# Patient Record
Sex: FEMALE | Race: WHITE | NOT HISPANIC OR LATINO | Employment: FULL TIME | URBAN - METROPOLITAN AREA
[De-identification: names, ages, dates, MRNs, and addresses within clinical notes are randomized per-mention and may not be internally consistent; named-entity substitution may affect disease eponyms.]

---

## 2024-09-11 ENCOUNTER — OFFICE VISIT (OUTPATIENT)
Dept: FAMILY MEDICINE CLINIC | Facility: CLINIC | Age: 24
End: 2024-09-11
Payer: COMMERCIAL

## 2024-09-11 ENCOUNTER — PATIENT ROUNDING (BHMG ONLY) (OUTPATIENT)
Dept: FAMILY MEDICINE CLINIC | Facility: CLINIC | Age: 24
End: 2024-09-11
Payer: COMMERCIAL

## 2024-09-11 VITALS
OXYGEN SATURATION: 98 % | SYSTOLIC BLOOD PRESSURE: 114 MMHG | BODY MASS INDEX: 23.76 KG/M2 | DIASTOLIC BLOOD PRESSURE: 68 MMHG | HEIGHT: 67 IN | HEART RATE: 52 BPM | WEIGHT: 151.4 LBS

## 2024-09-11 DIAGNOSIS — Z00.01 ENCOUNTER FOR GENERAL ADULT MEDICAL EXAMINATION WITH ABNORMAL FINDINGS: Primary | ICD-10-CM

## 2024-09-11 PROCEDURE — 99385 PREV VISIT NEW AGE 18-39: CPT | Performed by: NURSE PRACTITIONER

## 2024-09-11 PROCEDURE — 90651 9VHPV VACCINE 2/3 DOSE IM: CPT | Performed by: NURSE PRACTITIONER

## 2024-09-11 PROCEDURE — 90471 IMMUNIZATION ADMIN: CPT | Performed by: NURSE PRACTITIONER

## 2024-09-11 PROCEDURE — 90472 IMMUNIZATION ADMIN EACH ADD: CPT | Performed by: NURSE PRACTITIONER

## 2024-09-11 PROCEDURE — 90715 TDAP VACCINE 7 YRS/> IM: CPT | Performed by: NURSE PRACTITIONER

## 2024-09-11 NOTE — PROGRESS NOTES
How did you hear about our practice/providers? [facility], [place] [facility]    Tell me about your visit with us. What things went well?  EVERYTHING       We're always looking for ways to make our patients' experiences even better. Do you have recommendations on ways we may improve? NO      Overall were you satisfied with your first visit to our practice? YES       Is there anything else I can do for you?  Would you like for me to have my  you? NO    You may receive a survey from [employer] via mail, text or email to provide feedback about your visit.  We ask that you please take a few minutes to complete the survey and let us know how we are doing.  If for any reason you feel unable to give us the highest rating please let me know.

## 2024-09-11 NOTE — PROGRESS NOTES
Subjective   Agata Stokes is a 24 y.o. female who presents for annual female wellness exam.  Chief Complaint   Patient presents with    Establish Care     Pt needs annual check up and needs pap but she's on her period and wants to do a different day.         Patient is here today to establish care as a new patient. She has not had pcp since having pediatrician.    Has no new complaints today    Menstrual History: regular  Pregnancy History: none  Sexual History: not currently   Contraception: none  Hormone Replacement Therapy: n/a  Diet: overall healthy,  Drinks mostly water  Exercise: 2-3 days weekly   Do you feel safe? Reports she does  Have you ever been abused? Denies  Dentist:twice yearly   Eye doctor: not in last year, denies issues with vision    Mammogram:  n/a  Pap Smear: never  Bone Density: n/a  Colon Cancer Screening: n/a    Immunization History   Administered Date(s) Administered    Hpv9 09/11/2024    PPD Test 06/15/2021    Tdap 09/11/2024       The following portions of the patient's history were reviewed and updated as appropriate: allergies, current medications, past family history, past medical history, past social history, past surgical history and problem list.    History reviewed. No pertinent past medical history.    Past Surgical History:   Procedure Laterality Date    TONSILLECTOMY         History reviewed. No pertinent family history.    Social History     Socioeconomic History    Marital status: Single   Tobacco Use    Smoking status: Never    Smokeless tobacco: Never   Vaping Use    Vaping status: Never Used   Substance and Sexual Activity    Alcohol use: Yes     Comment: on occasion    Drug use: Never    Sexual activity: Not Currently     Partners: Male, Female       Review of Systems   Constitutional:  Negative for fatigue and fever.   Respiratory:  Negative for cough, shortness of breath and wheezing.    Cardiovascular:  Negative for chest pain.   Gastrointestinal:  Negative for  abdominal pain, constipation, diarrhea, nausea and vomiting.   Genitourinary:  Negative for dysuria and urgency.   Skin: Negative.    Neurological:  Negative for dizziness and headache.   Psychiatric/Behavioral:  Negative for suicidal ideas and depressed mood. The patient is not nervous/anxious.        Objective   Vitals:    09/11/24 1404   BP: 114/68   Pulse: 52   SpO2: 98%     Body mass index is 23.71 kg/m².  Physical Exam  Constitutional:       Appearance: Normal appearance.   Cardiovascular:      Rate and Rhythm: Normal rate and regular rhythm.      Pulses: Normal pulses.   Pulmonary:      Effort: Pulmonary effort is normal.      Breath sounds: Normal breath sounds.   Skin:     General: Skin is warm and dry.   Neurological:      Mental Status: She is alert.   Psychiatric:         Mood and Affect: Mood normal.         Behavior: Behavior normal.         Thought Content: Thought content normal.         Judgment: Judgment normal.           Assessment & Plan   Diagnoses and all orders for this visit:    1. Encounter for general adult medical examination with abnormal findings (Primary)  -     HPV Vaccine (HPV9)    Other orders  -     Tdap Vaccine => 8yo IM (BOOSTRIX/ADACEL)    Physical complete for patient.   Healthy adult physical  Ok for tdap  Ok for hpv vaccine. Discussed 2nd and third.  She is going to return to office in 2 months for second hpv and for PAP smear  She declines STD testing today.  She also declines flu vaccine    Follow up yearly for physical and sooner as needed.            Discussed the importance of maintaining a healthy weight and getting regular exercise.  Educated patient on the benefits of healthy diet.  Advise follow-up annually for wellness exams.

## 2024-09-16 ENCOUNTER — PATIENT ROUNDING (BHMG ONLY) (OUTPATIENT)
Dept: FAMILY MEDICINE CLINIC | Facility: CLINIC | Age: 24
End: 2024-09-16
Payer: COMMERCIAL

## 2024-09-24 ENCOUNTER — E-VISIT (OUTPATIENT)
Dept: FAMILY MEDICINE CLINIC | Facility: TELEHEALTH | Age: 24
End: 2024-09-24
Payer: COMMERCIAL

## 2024-09-24 PROCEDURE — FABRICHEALTHVISIT: Performed by: NURSE PRACTITIONER

## 2024-11-11 ENCOUNTER — OFFICE VISIT (OUTPATIENT)
Dept: FAMILY MEDICINE CLINIC | Facility: CLINIC | Age: 24
End: 2024-11-11
Payer: COMMERCIAL

## 2024-11-11 VITALS
HEIGHT: 67 IN | OXYGEN SATURATION: 95 % | SYSTOLIC BLOOD PRESSURE: 112 MMHG | DIASTOLIC BLOOD PRESSURE: 62 MMHG | HEART RATE: 63 BPM | WEIGHT: 151.2 LBS | BODY MASS INDEX: 23.73 KG/M2

## 2024-11-11 DIAGNOSIS — Z12.4 SCREENING FOR CERVICAL CANCER: Primary | ICD-10-CM

## 2024-11-11 DIAGNOSIS — Z23 ENCOUNTER FOR ADMINISTRATION OF VACCINE: ICD-10-CM

## 2024-11-11 PROCEDURE — 90471 IMMUNIZATION ADMIN: CPT | Performed by: NURSE PRACTITIONER

## 2024-11-11 PROCEDURE — 90651 9VHPV VACCINE 2/3 DOSE IM: CPT | Performed by: NURSE PRACTITIONER

## 2024-11-11 PROCEDURE — 99212 OFFICE O/P EST SF 10 MIN: CPT | Performed by: NURSE PRACTITIONER

## 2024-11-11 NOTE — PROGRESS NOTES
"Chief Complaint  Gynecologic Exam (Pt on last day of period said its very spotty. )    Subjective        Agata Stokes presents to [facility]  History of Present Illness    Patient is here today for pap smear    HPV vaccine due for second    Objective   Vital Signs:  /62   Pulse 63   Ht 170.2 cm (67.01\")   Wt 68.6 kg (151 lb 3.2 oz)   SpO2 95%   BMI 23.68 kg/m²   Estimated body mass index is 23.68 kg/m² as calculated from the following:    Height as of this encounter: 170.2 cm (67.01\").    Weight as of this encounter: 68.6 kg (151 lb 3.2 oz).    BMI is within normal parameters. No other follow-up for BMI required.      Physical Exam  Constitutional:       Appearance: Normal appearance.   Cardiovascular:      Rate and Rhythm: Normal rate and regular rhythm.      Pulses: Normal pulses.   Pulmonary:      Effort: Pulmonary effort is normal.      Breath sounds: Normal breath sounds.   Genitourinary:     General: Normal vulva.      Exam position: Supine.      Vagina: Normal.      Cervix: Normal.   Skin:     General: Skin is warm and dry.   Neurological:      Mental Status: She is alert.   Psychiatric:         Mood and Affect: Mood normal.         Behavior: Behavior normal.         Thought Content: Thought content normal.         Judgment: Judgment normal.        Result Review :                Assessment and Plan   Diagnoses and all orders for this visit:    1. Screening for cervical cancer (Primary)  -     IGP, Aptima HPV, CtNg Age Gdln    2. Encounter for administration of vaccine  -     HPV Vaccine (HPV9)      Smear obtained.  Will call with results any changes needed plan of care.  Second HPV vaccine given.  Instructions for follow-up discussed.  She verbalized understanding and is agreeable.        Follow Up   No follow-ups on file.  Patient was given instructions and counseling regarding her condition or for health maintenance advice. Please see specific information pulled " into the AVS if appropriate.

## 2024-11-18 LAB
AGE GDLN ACOG TESTING: NORMAL
C TRACH RRNA CVX QL NAA+PROBE: NEGATIVE
CONV .: NORMAL
CYTOLOGIST CVX/VAG CYTO: NORMAL
CYTOLOGY CVX/VAG DOC CYTO: NORMAL
CYTOLOGY CVX/VAG DOC THIN PREP: NORMAL
DX ICD CODE: NORMAL
Lab: NORMAL
N GONORRHOEA RRNA CVX QL NAA+PROBE: NEGATIVE
OTHER STN SPEC: NORMAL
STAT OF ADQ CVX/VAG CYTO-IMP: NORMAL

## 2024-11-18 NOTE — PROGRESS NOTES
Let patient know that Pap smear is negative for any lesion or malignancy.  Recommend repeating 3 years.

## 2025-03-20 ENCOUNTER — OFFICE VISIT (OUTPATIENT)
Dept: FAMILY MEDICINE CLINIC | Facility: CLINIC | Age: 25
End: 2025-03-20
Payer: COMMERCIAL

## 2025-03-20 VITALS
OXYGEN SATURATION: 99 % | WEIGHT: 159.4 LBS | DIASTOLIC BLOOD PRESSURE: 66 MMHG | HEART RATE: 62 BPM | HEIGHT: 67 IN | BODY MASS INDEX: 25.02 KG/M2 | SYSTOLIC BLOOD PRESSURE: 90 MMHG

## 2025-03-20 DIAGNOSIS — Z30.09 CONTRACEPTIVE EDUCATION: ICD-10-CM

## 2025-03-20 DIAGNOSIS — Z23 ENCOUNTER FOR ADMINISTRATION OF VACCINE: Primary | ICD-10-CM

## 2025-03-20 NOTE — PROGRESS NOTES
"Chief Complaint  Other (Discuss hormone free iud)    Subjective        Agata Stokes presents to [facility]  History of Present Illness    Patient presents today to discuss vaccine follow-up and discuss contraceptive measures.    Objective   Vital Signs:  BP 90/66   Pulse 62   Ht 170.2 cm (67.01\")   Wt 72.3 kg (159 lb 6.4 oz)   SpO2 99%   BMI 24.96 kg/m²   Estimated body mass index is 24.96 kg/m² as calculated from the following:    Height as of this encounter: 170.2 cm (67.01\").    Weight as of this encounter: 72.3 kg (159 lb 6.4 oz).    BMI is within normal parameters. No other follow-up for BMI required.      Physical Exam  Constitutional:       Appearance: Normal appearance.   Cardiovascular:      Rate and Rhythm: Normal rate and regular rhythm.      Pulses: Normal pulses.   Pulmonary:      Effort: Pulmonary effort is normal.      Breath sounds: Normal breath sounds.   Skin:     General: Skin is warm and dry.   Neurological:      Mental Status: She is alert.   Psychiatric:         Mood and Affect: Mood normal.         Behavior: Behavior normal.         Thought Content: Thought content normal.         Judgment: Judgment normal.        Result Review :                Assessment and Plan   Diagnoses and all orders for this visit:    1. Encounter for administration of vaccine (Primary)  -     HPV Vaccine (HPV9)  -     Meningococcal B (BEXSERO) intramuscular vaccine 0.5 mL    2. Contraceptive education  -     Ambulatory Referral to Obstetrics / Gynecology    Other orders  -     Cancel: Trumenba Menigococcal Vaccine IM      Okay for hep B and HPV vaccine.  Referral made to OB/GYN if she would like to consider nonhormonal IUD.  We did discuss other birth control options and this is the one she would like to do.  We did discuss different contraceptive measures in the meantime but she declined Depo, oral options.  Encouraged use of condom for sexual activity.       Follow Up   No " follow-ups on file.  Patient was given instructions and counseling regarding her condition or for health maintenance advice. Please see specific information pulled into the AVS if appropriate.

## 2025-03-27 ENCOUNTER — OFFICE VISIT (OUTPATIENT)
Dept: OBSTETRICS AND GYNECOLOGY | Age: 25
End: 2025-03-27
Payer: COMMERCIAL

## 2025-03-27 VITALS
WEIGHT: 158.4 LBS | SYSTOLIC BLOOD PRESSURE: 98 MMHG | HEIGHT: 67 IN | DIASTOLIC BLOOD PRESSURE: 58 MMHG | BODY MASS INDEX: 24.86 KG/M2

## 2025-03-27 DIAGNOSIS — Z30.09 ENCOUNTER FOR OTHER GENERAL COUNSELING OR ADVICE ON CONTRACEPTION: Primary | ICD-10-CM

## 2025-03-27 NOTE — PROGRESS NOTES
[facility]   Obstetrics and Gynecology   New Gynecology Visit    3/27/2025    Patient: Agata Stokes          MR#:[id number]    History of Present Illness    Chief Complaint   Patient presents with    Gynecologic Exam     New Gyn, pt would like to discuss IUD options.      25 y.o. female  who presents for contraception counseling.    Patient believes she desires an IUD. She had a Kyleena on  but reports she had bothersome irregular spotting and worsening of acne. She had that removed. She did not use any contraception for awhile, but is now in a relationship and desires reliable contraception.    She reports her periods are monthly with heavy bleeding and bad cramping which is why she tried a Kyleena.    Obstetric History:  OB History          0    Para   0    Term   0       0    AB   0    Living   0         SAB   0    IAB   0    Ectopic   0    Molar   0    Multiple   0    Live Births   0               Menstrual History:     Patient's last menstrual period was 03/15/2025 (exact date).       Sexual History:   Monogamous sexual relationship with male    Concern for STI?: denies  Breast concerns: denies  Dyspareunia: denies  Last Pap: 2024 NILM, s/p gardasil  ________________________________________  There is no problem list on file for this patient.    History reviewed. No pertinent past medical history.  Past Surgical History:   Procedure Laterality Date    TONSILLECTOMY      WISDOM TOOTH EXTRACTION       Social History     Tobacco Use    Smoking status: Never    Smokeless tobacco: Never   Vaping Use    Vaping status: Never Used   Substance Use Topics    Alcohol use: Yes     Alcohol/week: 6.0 standard drinks of alcohol     Types: 3 Glasses of wine, 3 Cans of beer per week     Comment: on occasion    Drug use: Never     Family History   Problem Relation Age of Onset    No Known Problems Father     No Known Problems Mother     No Known Problems Paternal Grandfather     No  "Known Problems Paternal Grandmother     No Known Problems Maternal Grandmother     No Known Problems Maternal Grandfather     Breast cancer Neg Hx     Uterine cancer Neg Hx     Ovarian cancer Neg Hx     Colon cancer Neg Hx     Pancreatic cancer Neg Hx     Prostate cancer Neg Hx      Prior to Admission medications    Not on File     ________________________________________    Review of Systems   Genitourinary:  Positive for menstrual problem.          Objective     BP 98/58   Ht 170.2 cm (67.01\")   Wt 71.8 kg (158 lb 6.4 oz)   LMP 03/15/2025 (Exact Date)   BMI 24.80 kg/m²    BP Readings from Last 3 Encounters:   25 98/58   25 90/66   24 112/62      Wt Readings from Last 3 Encounters:   25 71.8 kg (158 lb 6.4 oz)   25 72.3 kg (159 lb 6.4 oz)   24 68.6 kg (151 lb 3.2 oz)        BMI: Estimated body mass index is 24.8 kg/m² as calculated from the following:    Height as of this encounter: 170.2 cm (67.01\").    Weight as of this encounter: 71.8 kg (158 lb 6.4 oz).    Physical Exam  Vitals and nursing note reviewed.   Constitutional:       General: She is not in acute distress.     Appearance: Normal appearance.   HENT:      Head: Normocephalic and atraumatic.   Eyes:      Extraocular Movements: Extraocular movements intact.   Pulmonary:      Effort: Pulmonary effort is normal. No respiratory distress.   Abdominal:      General: There is no distension.   Skin:     General: Skin is warm and dry.   Neurological:      General: No focal deficit present.      Mental Status: She is alert.   Psychiatric:         Mood and Affect: Mood normal.         Behavior: Behavior normal.       Assessment:  Agata Stokes is a 25 y.o.  presenting for contraceptive counseling.       Encounter for other general counseling or advice on contraception  Reviewed contraceptive options with patient.  She initially thought she might want a copper IUD in efforts to avoid hormones, but after reviewing that " this would likely worsen her menstrual bleeding and cramping, she decided against this.  I did discuss that worsening of acne is overall fairly rare with a progesterone IUD, but since she did not have a good experience with Kyleena previously, I would advise trialing Mirena to see how this goes.  We discussed giving at least 3 months for bleeding to regulate and to monitor for other adverse side effects.  We did discuss options in case she does not like the Mirena as well.  It seems like a good option for her if the IUD is unsuccessful would be a vaginal ring-either NuvaRing or Annovera.  She states she cannot remember to take OCPs daily and feels the patch would be bothersome as well. Will check patient benefits and have her return to office for mirena IUD insertion.       Plan:  Return in about 2 weeks (around 4/10/2025) for Mirena insertion.    Mily Almonte MD  3/27/2025 10:30 EDT

## 2025-03-28 ENCOUNTER — PATIENT ROUNDING (BHMG ONLY) (OUTPATIENT)
Dept: OBSTETRICS AND GYNECOLOGY | Age: 25
End: 2025-03-28
Payer: COMMERCIAL

## 2025-04-10 ENCOUNTER — OFFICE VISIT (OUTPATIENT)
Dept: OBSTETRICS AND GYNECOLOGY | Age: 25
End: 2025-04-10
Payer: COMMERCIAL

## 2025-04-10 VITALS
DIASTOLIC BLOOD PRESSURE: 70 MMHG | HEIGHT: 67 IN | WEIGHT: 158.6 LBS | BODY MASS INDEX: 24.89 KG/M2 | SYSTOLIC BLOOD PRESSURE: 106 MMHG

## 2025-04-10 DIAGNOSIS — Z01.818 PRE-OP TESTING: ICD-10-CM

## 2025-04-10 DIAGNOSIS — Z30.430 ENCOUNTER FOR IUD INSERTION: Primary | ICD-10-CM

## 2025-04-10 LAB
B-HCG UR QL: NEGATIVE
EXPIRATION DATE: NORMAL
INTERNAL NEGATIVE CONTROL: NORMAL
INTERNAL POSITIVE CONTROL: NORMAL
Lab: NORMAL

## 2025-04-10 NOTE — PROGRESS NOTES
IUD Insertion Procedure Note    Indication: Desires long acting reversible contraception     Procedure Details   Urine pregnancy test confirmed negative.  The risks (including infection, bleeding, pain, and uterine perforation) and benefits of the procedure were explained to the patient and verbal informed consent was obtained. Time out was performed.     Cervix was cleansed with Betadine. Allis clamp applied to anterior cervix.  Uterus sounded to 8 cm. IUD inserted without difficulty. Strings trimmed to 2-3 cm.    IUD Information:  Mirena, Lot # [id number], Expiration date 2027 Feb.    Condition:  Stable    Complications:  None, patient tolerated the procedure well    Plan:  The patient was advised to call for fever, prolonged or severe pain, or persistent bleeding. She was advised to use NSAIDs as needed for mild to moderate pain.  Discussed using back up form of contraception for 1 week.  Discussed expectation of irregular bleeding for 3-6 months but then generally resolves.  Follow up 6 weeks for string check.    Procedure time: 7 minutes    Mily Almonte MD  04/10/25  09:50 EDT

## 2025-05-15 ENCOUNTER — OFFICE VISIT (OUTPATIENT)
Dept: OBSTETRICS AND GYNECOLOGY | Age: 25
End: 2025-05-15
Payer: COMMERCIAL

## 2025-05-15 VITALS
DIASTOLIC BLOOD PRESSURE: 72 MMHG | SYSTOLIC BLOOD PRESSURE: 116 MMHG | BODY MASS INDEX: 23.86 KG/M2 | HEIGHT: 67 IN | WEIGHT: 152 LBS

## 2025-05-15 DIAGNOSIS — Z30.431 IUD CHECK UP: Primary | ICD-10-CM

## 2025-05-15 NOTE — PROGRESS NOTES
[facility]  Obstetrics and Gynecology   Return Gynecology Visit    5/15/2025    Patient: Agata Stokes          MR#:[id number]    History of Present Illness    Chief Complaint   Patient presents with    Follow-up     Pt being seen for IUD string check, pt has no complaints today.     25 y.o. female  who presents for IUD string check.    Patient reports for the first couple of weeks she had daily bleeding and some cramping but this has now improved.  Few days ago she was supposed to start her period according to her flow zach and she only had light spotting.  Has had intercourse with IUD in place and denies any discomfort for her or her partner.  She is moving to [place] in a few months.    Obstetric History:  OB History          0    Para   0    Term   0       0    AB   0    Living   0         SAB   0    IAB   0    Ectopic   0    Molar   0    Multiple   0    Live Births   0               Menstrual History:     No LMP recorded. Patient has had an implant.       ________________________________________  There is no problem list on file for this patient.    History reviewed. No pertinent past medical history.  Past Surgical History:   Procedure Laterality Date    TONSILLECTOMY      WISDOM TOOTH EXTRACTION       Social History     Tobacco Use    Smoking status: Never    Smokeless tobacco: Never   Vaping Use    Vaping status: Never Used   Substance Use Topics    Alcohol use: Yes     Alcohol/week: 6.0 standard drinks of alcohol     Types: 3 Glasses of wine, 3 Cans of beer per week     Comment: on occasion    Drug use: Never     Family History   Problem Relation Age of Onset    No Known Problems Father     No Known Problems Mother     No Known Problems Paternal Grandfather     No Known Problems Paternal Grandmother     No Known Problems Maternal Grandmother     No Known Problems Maternal Grandfather     Breast cancer Neg Hx     Uterine cancer Neg Hx     Ovarian cancer Neg Hx     Colon cancer Neg  "Hx     Pancreatic cancer Neg Hx     Prostate cancer Neg Hx      Prior to Admission medications    Medication Sig Start Date End Date Taking? Authorizing Provider   Levonorgestrel (Mirena, 52 MG,) 20 MCG/DAY intrauterine device IUD To be inserted one time by prescriber. Route intrauterine. 3/27/25  Yes Mily Almonte MD     ________________________________________    Review of Systems   Genitourinary:  Negative for menstrual problem.          Objective     /72   Ht 170.2 cm (67.01\")   Wt 68.9 kg (152 lb)   BMI 23.80 kg/m²    BP Readings from Last 3 Encounters:   05/15/25 116/72   04/10/25 106/70   25 98/58      Wt Readings from Last 3 Encounters:   05/15/25 68.9 kg (152 lb)   04/10/25 71.9 kg (158 lb 9.6 oz)   25 71.8 kg (158 lb 6.4 oz)        BMI: Estimated body mass index is 23.8 kg/m² as calculated from the following:    Height as of this encounter: 170.2 cm (67.01\").    Weight as of this encounter: 68.9 kg (152 lb).    Physical Exam  Vitals and nursing note reviewed.   Constitutional:       General: She is not in acute distress.     Appearance: Normal appearance.   HENT:      Head: Normocephalic and atraumatic.   Eyes:      Extraocular Movements: Extraocular movements intact.   Pulmonary:      Effort: Pulmonary effort is normal. No respiratory distress.   Abdominal:      General: There is no distension.   Genitourinary:     General: Normal vulva.      Pubic Area: No rash.       Labia:         Right: No rash or lesion.         Left: No rash or lesion.       Urethra: No urethral lesion.      Vagina: Normal.      Cervix: Normal.      Comments: IUD strings visualized at external os  Skin:     General: Skin is warm and dry.   Neurological:      General: No focal deficit present.      Mental Status: She is alert.   Psychiatric:         Mood and Affect: Mood normal.         Behavior: Behavior normal.       Assessment:  Agata Stokes is a 25 y.o.  presenting for IUD string check.       IUD " check up  Strings visualized.  Discussed that over the next few months her bleeding will likely continue to be lighter and she will see if she still experience some bleeding with her cycle or not.         Plan:  Return if symptoms worsen or fail to improve.  Is moving in a few months and will need to establish care with new provider.    Mily Almonte MD  5/15/2025 13:08 EDT

## 2025-07-09 ENCOUNTER — OFFICE VISIT (OUTPATIENT)
Dept: FAMILY MEDICINE CLINIC | Facility: CLINIC | Age: 25
End: 2025-07-09
Payer: COMMERCIAL

## 2025-07-09 VITALS
WEIGHT: 156 LBS | HEART RATE: 43 BPM | SYSTOLIC BLOOD PRESSURE: 98 MMHG | BODY MASS INDEX: 24.48 KG/M2 | HEIGHT: 67 IN | DIASTOLIC BLOOD PRESSURE: 70 MMHG | OXYGEN SATURATION: 100 %

## 2025-07-09 DIAGNOSIS — L91.0 KELOID: Primary | ICD-10-CM

## 2025-07-09 PROCEDURE — 99213 OFFICE O/P EST LOW 20 MIN: CPT | Performed by: NURSE PRACTITIONER

## 2025-07-09 NOTE — PROGRESS NOTES
"Chief Complaint  Keloid (From piercing she had for a couple of years. )    Subjective        Agata Stokes presents to [facility]  History of Present Illness    Patient is here today with complaint of keloid on right ear from piercing.        Objective   Vital Signs:  BP 98/70   Pulse (!) 43 Comment: runs twice a week  Ht 170.2 cm (67.01\")   Wt 70.8 kg (156 lb)   SpO2 100%   BMI 24.43 kg/m²   Estimated body mass index is 24.43 kg/m² as calculated from the following:    Height as of this encounter: 170.2 cm (67.01\").    Weight as of this encounter: 70.8 kg (156 lb).    BMI is within normal parameters. No other follow-up for BMI required.      Physical Exam  Constitutional:       Appearance: Normal appearance.   HENT:      Ears:      Comments: External ear has large keloid at external helix  Cardiovascular:      Rate and Rhythm: Normal rate and regular rhythm.      Pulses: Normal pulses.   Pulmonary:      Effort: Pulmonary effort is normal.      Breath sounds: Normal breath sounds.   Skin:     General: Skin is warm and dry.   Neurological:      Mental Status: She is alert.   Psychiatric:         Mood and Affect: Mood normal.         Behavior: Behavior normal.         Thought Content: Thought content normal.         Judgment: Judgment normal.        Result Review :                Assessment and Plan   Diagnoses and all orders for this visit:    1. Keloid (Primary)  -     Ambulatory Referral to ENT (Otolaryngology)      Referral made to ENT for further evaluation and treatment options       Follow Up   No follow-ups on file.  Patient was given instructions and counseling regarding her condition or for health maintenance advice. Please see specific information pulled into the AVS if appropriate.             "